# Patient Record
Sex: MALE | HISPANIC OR LATINO | Employment: FULL TIME | URBAN - METROPOLITAN AREA
[De-identification: names, ages, dates, MRNs, and addresses within clinical notes are randomized per-mention and may not be internally consistent; named-entity substitution may affect disease eponyms.]

---

## 2022-08-14 ENCOUNTER — APPOINTMENT (OUTPATIENT)
Dept: RADIOLOGY | Facility: CLINIC | Age: 22
End: 2022-08-14
Payer: COMMERCIAL

## 2022-08-14 ENCOUNTER — HOSPITAL ENCOUNTER (EMERGENCY)
Facility: CLINIC | Age: 22
Discharge: HOME | End: 2022-08-14
Attending: EMERGENCY MEDICINE | Admitting: EMERGENCY MEDICINE
Payer: COMMERCIAL

## 2022-08-14 VITALS
OXYGEN SATURATION: 98 % | HEIGHT: 69 IN | HEART RATE: 70 BPM | WEIGHT: 150 LBS | SYSTOLIC BLOOD PRESSURE: 141 MMHG | TEMPERATURE: 98.4 F | DIASTOLIC BLOOD PRESSURE: 66 MMHG | RESPIRATION RATE: 16 BRPM | BODY MASS INDEX: 22.22 KG/M2

## 2022-08-14 DIAGNOSIS — R06.02 SHORTNESS OF BREATH: ICD-10-CM

## 2022-08-14 DIAGNOSIS — R11.0 NAUSEA: ICD-10-CM

## 2022-08-14 DIAGNOSIS — R20.2 TINGLING SENSATION IN FACE: Primary | ICD-10-CM

## 2022-08-14 LAB
ANION GAP SERPL CALCULATED.3IONS-SCNC: 10 MMOL/L (ref 5–18)
BUN SERPL-MCNC: 14 MG/DL (ref 8–22)
CALCIUM SERPL-MCNC: 9.9 MG/DL (ref 8.5–10.5)
CHLORIDE BLD-SCNC: 107 MMOL/L (ref 98–107)
CO2 SERPL-SCNC: 23 MMOL/L (ref 22–31)
CREAT SERPL-MCNC: 0.91 MG/DL (ref 0.7–1.3)
ERYTHROCYTE [DISTWIDTH] IN BLOOD BY AUTOMATED COUNT: 12.6 % (ref 10–15)
GFR SERPL CREATININE-BSD FRML MDRD: >90 ML/MIN/1.73M2
GLUCOSE BLD-MCNC: 96 MG/DL (ref 70–125)
HCT VFR BLD AUTO: 39.9 % (ref 40–53)
HGB BLD-MCNC: 13.5 G/DL (ref 13.3–17.7)
MCH RBC QN AUTO: 29 PG (ref 26.5–33)
MCHC RBC AUTO-ENTMCNC: 33.8 G/DL (ref 31.5–36.5)
MCV RBC AUTO: 86 FL (ref 78–100)
PLATELET # BLD AUTO: 395 10E3/UL (ref 150–450)
POTASSIUM BLD-SCNC: 3.3 MMOL/L (ref 3.5–5)
RBC # BLD AUTO: 4.65 10E6/UL (ref 4.4–5.9)
SODIUM SERPL-SCNC: 140 MMOL/L (ref 136–145)
TSH SERPL DL<=0.005 MIU/L-ACNC: 1.74 UIU/ML (ref 0.3–5)
WBC # BLD AUTO: 7.4 10E3/UL (ref 4–11)

## 2022-08-14 PROCEDURE — 84443 ASSAY THYROID STIM HORMONE: CPT

## 2022-08-14 PROCEDURE — 93005 ELECTROCARDIOGRAM TRACING: CPT

## 2022-08-14 PROCEDURE — 36415 COLL VENOUS BLD VENIPUNCTURE: CPT

## 2022-08-14 PROCEDURE — 71046 X-RAY EXAM CHEST 2 VIEWS: CPT

## 2022-08-14 PROCEDURE — 99285 EMERGENCY DEPT VISIT HI MDM: CPT | Mod: 25

## 2022-08-14 PROCEDURE — 85027 COMPLETE CBC AUTOMATED: CPT

## 2022-08-14 PROCEDURE — 80048 BASIC METABOLIC PNL TOTAL CA: CPT

## 2022-08-14 RX ORDER — HYDROXYZINE HYDROCHLORIDE 10 MG/1
10 TABLET, FILM COATED ORAL AT BEDTIME
Qty: 15 TABLET | Refills: 0 | Status: SHIPPED | OUTPATIENT
Start: 2022-08-14

## 2022-08-14 ASSESSMENT — ACTIVITIES OF DAILY LIVING (ADL): ADLS_ACUITY_SCORE: 35

## 2022-08-14 ASSESSMENT — ENCOUNTER SYMPTOMS
FEVER: 0
VOMITING: 0
BACK PAIN: 0
ABDOMINAL PAIN: 0
CHILLS: 0
NAUSEA: 0
HEADACHES: 0

## 2022-08-15 LAB
ATRIAL RATE - MUSE: 72 BPM
DIASTOLIC BLOOD PRESSURE - MUSE: NORMAL MMHG
INTERPRETATION ECG - MUSE: NORMAL
P AXIS - MUSE: 41 DEGREES
PR INTERVAL - MUSE: 156 MS
QRS DURATION - MUSE: 88 MS
QT - MUSE: 388 MS
QTC - MUSE: 424 MS
R AXIS - MUSE: 69 DEGREES
SYSTOLIC BLOOD PRESSURE - MUSE: NORMAL MMHG
T AXIS - MUSE: 69 DEGREES
VENTRICULAR RATE- MUSE: 72 BPM

## 2022-08-15 NOTE — ED PROVIDER NOTES
EMERGENCY DEPARTMENT ENCOUNTER      NAME: James Roman  AGE: 21 year old male  YOB: 2000  MRN: [id number]  EVALUATION DATE & TIME: 8/14/2022 10:24 PM    PCP: No primary care provider on file.    ED PROVIDER: Mercedes Oneill PA-C      Chief Complaint   Patient presents with     Shortness of Breath         FINAL IMPRESSION:  1. Tingling sensation in face    2. Shortness of breath    3. Nausea          MEDICAL DECISION MAKING:    Pertinent Labs & Imaging studies reviewed. (See chart for details)  James Roman is a 21 year old male with no pertinent history who presents to this ED by walking for evaluation of shortness of breath and face tingling.  Patient reports the symptoms have been happening on and off for the past few months, had an episode today while driving home from [employer].    On my initial evaluation, vital signs normal. On physical exam he is slightly anxious appearing but otherwise comfortable and lying in bed.  Awake, alert.  Heart sounds are normal, lungs are clear.  No abdominal tenderness on palpation.  Full neurologic examination done without focal deficit.  Good strength and sensation in all 4 extremities, normal pulses.  Able To ambulate without difficulty.    Differential diagnosis includes electrolyte imbalance, thyroid disorder, CVA, cardiac dysrhythmia, anemia, pneumonia, pneumothorax. Emergency department workup included CBC,BMP, CXR, TSH, EKG.    Workup thus far are unremarkable.  Electrolytes within normal limits, no sign of anemia.  TSH normal, so low suspicion for thyroid disorder.  EKG reassuring so low suspicion for cardiac cause of symptoms. CXR clear without signs of pneumonia or PTX. Suspect symptoms due to anxiety as patient admits to history of panic attacks. Did prescribe hydroxyzine and sent referral for primary care provider establish care for ongoing management of anxiety.     Patient has had serial examinations and notes significant improvement.      Patient was discharged in stable condition with treatment plan as below. Instructed to follow up with primary care provider in 3 days for further workup and treatment of anxiety and return to the emergency department with any new or worsening of symptoms. Patient expressed understanding, feels comfortable, and is in agreement with this plan. All questions addressed prior to discharge.    ED COURSE:  10:41 PM  I reviewed the patient's chart. I met with the patient to gather history and to perform my initial exam.    I wore appropriate PPE during this encounter including: facemask & eye protection   10:58 PM I staffed this patient case with Dr. Stern who agrees with plan at this time and will see the patient for their history, exam, and complete evaluation.  11:54 PM  I rechecked the patient and updated him on results. We discussed plan for discharge including treatment plan, follow-up and return precautions to emergency department.  Patient voiced understanding and in agreement with this plan.    At the conclusion of the encounter I discussed the results of all of the tests and the disposition. The questions were answered. The patient or family acknowledged understanding and was agreeable with the care plan.     MEDICATIONS GIVEN IN THE EMERGENCY:  Medications - No data to display    NEW PRESCRIPTIONS STARTED AT TODAY'S ER VISIT  Discharge Medication List as of 8/14/2022 11:53 PM      START taking these medications    Details   hydrOXYzine (ATARAX) 10 MG tablet Take 1 tablet (10 mg) by mouth At Bedtime, Disp-15 tablet, R-0, Local Print                  =================================================================    HPI:    Patient information was obtained from: Patient     Use of Interpretor: N/A       James Roman is a 21 year old male with no pertinent history who presents to this ED by walking for evaluation of shortness of breath and face tingling.     For the past few months, the patient endorses  facial tingling and shortness of breath. When the patient starts feeling this facial tingling, he endorses feeling a sense of panic from it which causes him to hyperventilate and become short of breath.      At present, [place] ([place] and began feeling bilateral facial tingling. Patient then started to feel sharp pain in the areas and pulled over. He sat in the back seat of the car and noted his right arm felt stiff and tight, which has never happened before.     Patient denies headache, chest pain, back pain, abdominal pain, nausea, vomiting, fever, chills, or any other complaints at this time.     Of note, patient had 1 drink earlier today (~12:00 PM). The patient noted that he had a sore throat last week that has been resolved and denies any COVID exposure. Patient denies any pertinent family history of diseases and denies a social history of drug abuse.      REVIEW OF SYSTEMS:  Review of Systems   Constitutional: Negative for chills and fever.   Gastrointestinal: Negative for abdominal pain, nausea and vomiting.   Musculoskeletal: Negative for back pain.        Positive for right hand cramping (resolved)    Neurological: Negative for headaches.        Positive for bilateral facial tingling.    All other systems reviewed and are negative.      PAST MEDICAL HISTORY:  History reviewed. No pertinent past medical history.    PAST SURGICAL HISTORY:  History reviewed. No pertinent surgical history.    CURRENT MEDICATIONS:    No current facility-administered medications for this encounter.    Current Outpatient Medications:      hydrOXYzine (ATARAX) 10 MG tablet, Take 1 tablet (10 mg) by mouth At Bedtime, Disp: 15 tablet, Rfl: 0    ALLERGIES:  No Known Allergies    FAMILY HISTORY:  History reviewed. No pertinent family history.    SOCIAL HISTORY:        VITALS:  Patient Vitals for the past 24 hrs:   BP Temp Temp src Pulse Resp SpO2 Height Weight   08/14/22 2356 -- 98.4  F (36.9  " C) Temporal -- 16 98 % -- --   08/14/22 2214 (!) 141/66 -- -- 70 18 100 % 1.753 m (5' 9\") 68 kg (150 lb)       PHYSICAL EXAM    Constitutional: Well developed, Well nourished, NAD,slightly anxious appearing   HENT: Normocephalic, Atraumatic, Bilateral external ears normal, Oropharynx normal, mucous membranes moist, Nose normal.   Neck: Normal range of motion, No tenderness, Supple, No stridor.  Eyes: PERRL, EOMI, Conjunctiva normal, No discharge.   Respiratory: Normal breath sounds, No respiratory distress, No wheezing, Speaks full sentences easily. No cough.  Cardiovascular: Normal heart rate, Regular rhythm, No murmurs, No rubs, No gallops. Chest wall nontender.  GI: Soft, No tenderness, No masses, No flank tenderness. No rebound or guarding.  Musculoskeletal: 2+ DP pulses. No edema. No cyanosis, No clubbing. Good range of motion in all major joints. No tenderness to palpation or major deformities noted. No tenderness of the CTLS spine.   Integument: Warm, Dry, No erythema, No rash. No petechiae.  Neurologic: Alert & oriented x 3, Normal motor function, Normal sensory function, No focal deficits noted. Normal gait. CN2-12 in tact  Psychiatric: Affect normal, Judgment normal, Mood normal. Cooperative.    LAB:  All pertinent labs reviewed and interpreted.  Labs Ordered and Resulted from Time of ED Arrival to Time of ED Departure   CBC WITH PLATELETS - Abnormal       Result Value    WBC Count 7.4      RBC Count 4.65      Hemoglobin 13.5      Hematocrit 39.9 (*)     MCV 86      MCH 29.0      MCHC 33.8      RDW 12.6      Platelet Count 395     BASIC METABOLIC PANEL - Abnormal    Sodium 140      Potassium 3.3 (*)     Chloride 107      Carbon Dioxide (CO2) 23      Anion Gap 10      Urea Nitrogen 14      Creatinine 0.91      Calcium 9.9      Glucose 96      GFR Estimate >90     TSH WITH FREE T4 REFLEX - Normal    TSH 1.74         RADIOLOGY:  Reviewed all pertinent imaging. Please see official radiology report.  Chest XR, "  PA & LAT   Final Result   IMPRESSION: Negative chest.          EKG:    Performed at: 2338  Rate: 72   Impression: normal sinus with arrhythmia     I have reviewed and interpreted the EKG(s) documented above along with Dr. Stern.        Diagnosis:  1. Tingling sensation in face    2. Shortness of breath    3. Nausea            I, Mercedes Monson, am serving as a scribe to document services personally performed by Mercedes Oneill PA-C based on my observation and the provider's statements to me. I, Mercedes Oneill PA-C attest that Mercedes Monson is acting in a scribe capacity, has observed my performance of the services and has documented them in accordance with my direction.    Mercedes Oneill PA-C  Emergency Medicine  [facility]  8/14/2022     Mercedes Oneill PA-C  08/15/22 0005

## 2022-08-15 NOTE — DISCHARGE INSTRUCTIONS
Please bring this paperwork with you to your follow-up appointment.    You were seen in the urgent care/emergency department for tingling in face.     Although we are unsure of the cause of your symptoms, we were able to rule out any emergent causes. Your blood work today looked normal, your chest xray was normal. Your thyroid level was normal.     Your symptoms could be due to anxiety. A referral was placed today for you to establish care with a primary care provider for ongoing management of your symptoms. You will receive a call in the next 1-2 days to make an appointment.    For your symptoms:  Until you establish care with a primary provider, Please take Hydroxyzine at night for symptoms related to anxiety.     Tylenol/ibuprofen as needed  You may take up to 650 mg of Tylenol (acetaminophen) up to 4 times daily and up to 600 mg of ibuprofen up to 4 times daily as needed for fever, pain.  Please do not take more than the daily maximum recommended dose (tylenol = 4 grams, ibuprofen = 2.4 grams) as it can cause harm to your liver, kidneys, stomach.  It is best to take ibuprofen with food. Please read labels of any over-the-counter medicine you may be taking as it may contain Tylenol (acetaminophen) or Advil (ibuprofen).     Follow up with your primary care provider for recheck in 3 days for ER follow up.     Return to the emergency department if you develop worsening tingling, headaches, shortness of breath, or any other new worsening or concerning symptoms. We'd be happy to see you again.    Thank you for allowing us to be part of your care today.    Take care!  -Mercedes Oneill PA-C

## 2022-08-15 NOTE — ED PROVIDER NOTES
"Emergency Department Midlevel Supervisory Note     I personally saw the patient and performed a substantive portion of the visit including all aspects of the medical decision making.    ED Course:   Mercedes Oneill PA-C staffed patient with me. I agree with their assessment and plan of management, and I will see the patient.   I met with the patient to introduce myself, gather additional history, perform my initial exam, and discuss the plan.     Brief HPI:     James Roman is a 21 year old male who presents for evaluation of shortness of breath and bilateral facial tingling. [place] [employer] (~[place] prior) and began feeling bilateral facial tingling. Patient then started to feel sharp pain in the areas and pulled over. He sat in the back seat of the car and noted his right arm felt stiff and tight, which has never happened before.     I, Mercedes Monson, am serving as a scribe to document services personally performed by Emiliano Stern MD., based on my observations and the provider's statements to me.   I, Emiliano Stern MD., attest that Mercedes Monson was acting in a scribe capacity, has observed my performance of the services and has documented them in accordance with my direction.    Brief Physical Exam: BP (!) 141/66 (BP Location: Left arm, Patient Position: Chair)   Pulse 70   Resp 18   Ht 1.753 m (5' 9\")   Wt 68 kg (150 lb)   SpO2 100%   BMI 22.15 kg/m    Constitutional:  Alert, in no acute distress  EYES: Conjunctivae clear  HENT:  Atraumatic, normocephalic  Respiratory:  Respirations even, unlabored, in no acute respiratory distress  Cardiovascular:  Regular rate and rhythm, good peripheral perfusion  GI: Soft, nondistended, nontender, no palpable masses, no rebound, no guarding   Musculoskeletal:  No edema. No cyanosis. Range of motion major extremities intact.    Integument: Warm, Dry, No erythema, No rash.   Neurologic:  Alert & oriented, no focal deficits noted  Psych: Normal " mood and affect     MDM:  Patient evaluated for symptoms shortness of breath and anxiety.  He has a history of panic attacks.  He was drinking earlier in the afternoon.  His exam and work-up are normal.  I suspect anxiety or panic attack.  He may have some mild withdrawal.  He is stable for discharge home       No diagnosis found.    Labs and Imaging:  Results for orders placed or performed during the hospital encounter of 08/14/22   CBC (+ platelets, no diff)   Result Value Ref Range    WBC Count 7.4 4.0 - 11.0 10e3/uL    RBC Count 4.65 4.40 - 5.90 10e6/uL    Hemoglobin 13.5 13.3 - 17.7 g/dL    Hematocrit 39.9 (L) 40.0 - 53.0 %    MCV 86 78 - 100 fL    MCH 29.0 26.5 - 33.0 pg    MCHC 33.8 31.5 - 36.5 g/dL    RDW 12.6 10.0 - 15.0 %    Platelet Count 395 150 - 450 10e3/uL     I have reviewed the relevant laboratory and radiology studies      Emiliano Stern MD.  [facility] EMERGENCY ROOM  [address] [id number] [phone]     Emiliano Stern MD  08/14/22 1516

## 2022-08-15 NOTE — ED TRIAGE NOTES
[place] and began feeling a tingling in his face bilaterally; states this has happened in the past with anxiety prior; also states he started feeling SOB and tightness in his chest/abdomen; during this event his right hand cramped up.  Patient is calm and not distressed in triage.

## 2022-10-03 ENCOUNTER — HEALTH MAINTENANCE LETTER (OUTPATIENT)
Age: 22
End: 2022-10-03

## 2023-10-22 ENCOUNTER — HEALTH MAINTENANCE LETTER (OUTPATIENT)
Age: 23
End: 2023-10-22

## 2024-12-15 ENCOUNTER — HEALTH MAINTENANCE LETTER (OUTPATIENT)
Age: 24
End: 2024-12-15